# Patient Record
(demographics unavailable — no encounter records)

---

## 2025-05-05 NOTE — REVIEW OF SYSTEMS
[Chest Discomfort] : chest discomfort [Negative] : Musculoskeletal [Dyspnea on exertion] : not dyspnea during exertion [Lower Ext Edema] : no extremity edema [Leg Claudication] : no intermittent leg claudication [Palpitations] : no palpitations [Orthopnea] : no orthopnea [PND] : no PND [Syncope] : no syncope

## 2025-05-05 NOTE — CARDIOLOGY SUMMARY
[de-identified] : 05/01/25 - normal sinus rhythm, consider old anteroseptal infarct [de-identified] : 10/04/24-10/11/24 ([facility]) - 6 beats of SVT at 117 bpm, PACs, PVCs 10/03/24-10/04/24 ([facility]) - SVE <1%, VE <1% [de-identified] : 05/22/24 (cardiac CTA) - calcium score 20 Agatston units, mLAD <25%, pRCA <25%, mRCA <25%

## 2025-05-05 NOTE — HISTORY OF PRESENT ILLNESS
[FreeTextEntry1] : Experiences mid sternal chest pain maybe once a week which may last about an hour. Works as a supervisor. Has allergies. Denies shortness of breath or palpitations.

## 2025-05-05 NOTE — PHYSICAL EXAM
[Well Developed] : well developed [Well Nourished] : well nourished [No Acute Distress] : no acute distress [Normal Conjunctiva] : normal conjunctiva [Normal Venous Pressure] : normal venous pressure [No Carotid Bruit] : no carotid bruit [Normal S1, S2] : normal S1, S2 [No Murmur] : no murmur [No Rub] : no rub [No Gallop] : no gallop [Clear Lung Fields] : clear lung fields [Good Air Entry] : good air entry [No Respiratory Distress] : no respiratory distress  [Soft] : abdomen soft [Non Tender] : non-tender [Normal Gait] : normal gait [No Edema] : no edema [No Cyanosis] : no cyanosis [No Rash] : no rash [No Skin Lesions] : no skin lesions [Moves all extremities] : moves all extremities [No Focal Deficits] : no focal deficits [Normal Speech] : normal speech [Alert and Oriented] : alert and oriented [de-identified] : nonreproducible chest pain

## 2025-05-05 NOTE — PHYSICAL EXAM
[Well Developed] : well developed [Well Nourished] : well nourished [No Acute Distress] : no acute distress [Normal Conjunctiva] : normal conjunctiva [Normal Venous Pressure] : normal venous pressure [No Carotid Bruit] : no carotid bruit [Normal S1, S2] : normal S1, S2 [No Murmur] : no murmur [No Rub] : no rub [No Gallop] : no gallop [Clear Lung Fields] : clear lung fields [Good Air Entry] : good air entry [No Respiratory Distress] : no respiratory distress  [Soft] : abdomen soft [Non Tender] : non-tender [Normal Gait] : normal gait [No Edema] : no edema [No Cyanosis] : no cyanosis [No Rash] : no rash [No Skin Lesions] : no skin lesions [Moves all extremities] : moves all extremities [No Focal Deficits] : no focal deficits [Normal Speech] : normal speech [Alert and Oriented] : alert and oriented [de-identified] : nonreproducible chest pain

## 2025-05-05 NOTE — DISCUSSION/SUMMARY
[Unlikely Cardiac Ischemia (Low Prob.)] : chest pain unlikely to represent cardiac ischemia (low probability) [Hypertension] : hypertension [Stable] : stable [EKG obtained to assist in diagnosis and management of assessed problem(s)] : EKG obtained to assist in diagnosis and management of assessed problem(s) [FreeTextEntry1] : Currently stable from a cardiovascular standpoint. Normotensive. Euvolemic. Atypical chest pains. Consider GI evaluation. Continue current medications. ECG completed today and reviewed (findings as noted above). Given patient's CAD risk factors, will schedule an exercise nuclear stress test to rule out any significant CAD. In addition, will schedule an echocardiogram to assess his cardiac structures and function. Pending the test results, I will make further recommendations.

## 2025-05-05 NOTE — CARDIOLOGY SUMMARY
[de-identified] : 05/01/25 - normal sinus rhythm, consider old anteroseptal infarct [de-identified] : 10/04/24-10/11/24 ([facility]) - 6 beats of SVT at 117 bpm, PACs, PVCs 10/03/24-10/04/24 ([facility]) - SVE <1%, VE <1% [de-identified] : 05/22/24 (cardiac CTA) - calcium score 20 Agatston units, mLAD <25%, pRCA <25%, mRCA <25%